# Patient Record
Sex: MALE | Race: WHITE
[De-identification: names, ages, dates, MRNs, and addresses within clinical notes are randomized per-mention and may not be internally consistent; named-entity substitution may affect disease eponyms.]

---

## 2021-01-13 ENCOUNTER — HOSPITAL ENCOUNTER (OUTPATIENT)
Dept: HOSPITAL 50 - VM.ED | Age: 81
Setting detail: OBSERVATION
LOS: 2 days | Discharge: NURSING, REHAB OR LONG-TERM CARE | End: 2021-01-15
Attending: ADVANCED PRACTICE MIDWIFE | Admitting: ADVANCED PRACTICE MIDWIFE
Payer: MEDICARE

## 2021-01-13 DIAGNOSIS — E87.8: ICD-10-CM

## 2021-01-13 DIAGNOSIS — Z88.8: ICD-10-CM

## 2021-01-13 DIAGNOSIS — N40.0: ICD-10-CM

## 2021-01-13 DIAGNOSIS — Z20.822: ICD-10-CM

## 2021-01-13 DIAGNOSIS — Z88.5: ICD-10-CM

## 2021-01-13 DIAGNOSIS — E86.0: Primary | ICD-10-CM

## 2021-01-13 DIAGNOSIS — Z79.899: ICD-10-CM

## 2021-01-13 DIAGNOSIS — Z79.82: ICD-10-CM

## 2021-01-13 DIAGNOSIS — R50.9: ICD-10-CM

## 2021-01-13 DIAGNOSIS — Z86.59: ICD-10-CM

## 2021-01-13 LAB
ANION GAP SERPL CALC-SCNC: 12.6 MMOL/L (ref 5–15)
CHLORIDE SERPL-SCNC: 117 MMOL/L (ref 98–107)
SODIUM SERPL-SCNC: 153 MMOL/L (ref 136–145)

## 2021-01-13 PROCEDURE — U0002 COVID-19 LAB TEST NON-CDC: HCPCS

## 2021-01-13 PROCEDURE — G0378 HOSPITAL OBSERVATION PER HR: HCPCS

## 2021-01-13 NOTE — EDM.PDOC
ED HPI GENERAL MEDICAL PROBLEM





- General


Chief Complaint: Fever


Stated Complaint: TEMP, NOT EATING OR DRINKING


Time Seen by Provider: 21 15:55


Source of Information: Reports: Family, Nursing Home Records





- History of Present Illness


INITIAL COMMENTS - FREE TEXT/NARRATIVE: 





Ilan is a 79 y/o man who is brought to the ER by EMS from the [facility]. He has had an intermittent fever up to 101.9 that came down with APAP 

suppositories. He also has been eating less and more lethargic. He is not verbal

at his baseline, but he has been more weak. COVID test at the nursing home this 

AM was negative.


Treatments PTA: Reports: IV/IO





- Related Data


                                    Allergies











Allergy/AdvReac Type Severity Reaction Status Date / Time


 


ciprofloxacin Allergy  Cannot Verified 21 15:55





   Remember  


 


codeine Allergy  Cannot Verified 21 15:55





   Remember  


 


curcumin Allergy  Cannot Uncoded 21 15:55





   Remember  














Past Medical History


Gastrointestinal History: Reports: Chronic Constipation, Hemorrhoids


Genitourinary History: Reports: BPH


Musculoskeletal History: Reports: Gout


Neurological History: Reports: Parkinson's


Psychiatric History: Reports: Dementia, Depression


Other Psychiatric History: lewy body dementia


Other Dermatologic History: dermatitis





Social & Family History





- Tobacco Use


Tobacco Use Status *Q: Unknown Ever Used Tobacco





Review of Systems





- Review of Systems


Review Of Systems: Unable To Obtain


Reason Not Obtained: Patient has dementia and any ROS obtained is noted in the 

ROS


Constitutional: Reports: Fever, Weakness





ED EXAM, GENERAL





- Physical Exam


Exam: See Below


Exam Limited By: Other (Hx of Lewy Body Demenita)


General Appearance: Alert, Cachetic (Elderly male, NAD. He is lying quietly on 

the ER cart. He does not offer any verbal response, but does open his eyes to 

his wife's voice.)


Eye Exam: Bilateral Eye: PERRL


Ears: Normal External Exam, Normal Canal, Normal TMs


Nose: Normal Inspection


Throat/Mouth: Other (Lips and tongue and dry and sticky)


Head: Atraumatic, Normocephalic


Neck: Normal Inspection, Supple, Non-Tender


Respiratory/Chest: No Respiratory Distress, Lungs Clear, Decreased Breath Sounds


Cardiovascular: Normal Peripheral Pulses, Regular Rate, Rhythm, No Murmur


GI/Abdominal: Normal Bowel Sounds, Soft, Other (note well healed midline 

surgical scar)


 (Male) Exam: Normal Inspection


Rectal (Males) Exam: Deferred


Back Exam: Normal Inspection


Extremities: Non-Tender, No Pedal Edema


Neurological: Alert, CN II-XII Intact, Slow to Respond, Unresponsive


Skin Exam: Warm, Dry, Intact, Normal Color


Lymphatic: No Adenopathy





Course





- Vital Signs


Text/Narrative:: 





1550 The patient was seen by the CNP. Labs and CXR ordered. He was given a liter

of NS by EMS enroute to the ER. Will await labs for further orders.





1715 CMP: Sodium=152, Chloride=117, BUN=52, Magnesium=52, CBC neg. Dr Soha Chacon consulted after labs reviewed. Influenza and COVID ordered, UA pending 

yet. NS 1 liter bolus ordered (#2)





1800 Remaining labs reviewed. COVID neg, Influenza A/B negative. UA neg. Case 

discussed with Dr Chacon and she does not feel he meets Acute Inpatient 

criteria. Patient was reassessed and he is not sitting up more on the ER cart, 

his wife has been offering him water and he drank 20 oz of water easily and IV 

fluids infusing. He now has his eyes open and is talking more to his wife and 

daughter.





CNP discussed either returning to [facility] for IV fluids and then 

having labs repeated and the patient seen by Dr Uyen Martinez in the AM for a 

recheck at the clinic or staying Observation for IV fluids and repeat labs. 

Patient's wife reports that he just recently moved here to the [facility] from [place] and he has not really gotten settled there yet so a night 

here on Observation would be her preference. Today is apparently his last day of

quarantine following the nursing home admission and his wife has not seen him 

until tonight since he was admitted.





Plan Observation Admission for IV fluids and repeat labs.








Last Recorded V/S: 


                                Last Vital Signs











Temp  37.1 C   21 15:30


 


Pulse  60   21 15:30


 


Resp  20   21 15:30


 


BP  123/75   21 15:30


 


Pulse Ox  95   21 15:30














- Orders/Labs/Meds


Orders: 


                               Active Orders 24 hr











 Category Date Time Status


 


 Chest 1V Frontal [CR] Stat Exams  21 16:29 Taken


 


 CULTURE BLOOD [BC] Stat Lab  21 16:40 Received


 


 CULTURE BLOOD [BC] Stat Lab  21 16:44 Received


 


 Sodium Chloride 0.9% [Normal Saline] 1,000 ml Med  21 16:30 Active





 IV ONETIME   


 


 Sodium Chloride 0.9% [Saline Flush] Med  21 16:29 Active





 10 ml FLUSH ASDIRECTED PRN   


 


 Blood Culture x2 Reflex Set [OM.PC] Stat Oth  21 16:29 Ordered


 


 Saline Lock Insert [OM.PC] Stat Oth  21 16:29 Ordered








                                Medication Orders





Sodium Chloride (Normal Saline)  1,000 mls @ 250 mls/hr IV ONETIME ONE


   Stop: 21 20:29


   Last Admin: 21 16:53  Dose: 250 mls/hr


   Documented by: KATHRYN


Sodium Chloride (Saline Flush)  10 ml FLUSH ASDIRECTED PRN


   PRN Reason: Keep Vein Open








Labs: 


                                Laboratory Tests











  21 Range/Units





  16:40 16:40 16:40 


 


WBC  6.5    (4.0-10.0)  x10^3/uL


 


RBC  4.02 L    (4.5-6.0)  x10^6/uL


 


Hgb  12.5 L    (14.0-18.0)  g/dL


 


Hct  39.0 L    (40.0-52.0)  %


 


MCV  97.0 H    (78.0-93.0)  fL


 


MCH  31.1    (26.0-32.0)  pg


 


MCHC  32.1    (32.0-36.0)  g/dL


 


RDW Coeff of Kevin  14.1    (10.0-15.0)  %


 


Plt Count  186    (130-400)  x10^3/uL


 


Neut % (Auto)  78.1    (50.0-80.0)  %


 


Lymph % (Auto)  16.1 L    (25.0-50.0)  %


 


Mono % (Auto)  5.5    (2.0-11.0)  %


 


Eos % (Auto)  0.0    (0.0-4.0)  %


 


Baso % (Auto)  0.3    (0.2-1.2)  %


 


Sodium   153 H   (136-145)  mmol/L


 


Potassium   3.6   (3.5-5.1)  mmol/L


 


Chloride   117 H   ()  mmol/L


 


Carbon Dioxide   27   (21-32)  mmol/L


 


Anion Gap   12.6   (5-15)  mmol/L


 


BUN   52 H   (7-18)  mg/dL


 


Creatinine   1.1   (0.70-1.30)  mg/dL


 


Est Cr Clr Drug Dosing   TNP   


 


Estimated GFR (MDRD)   > 60   


 


Glucose   106   ()  mg/dL


 


Lactic Acid    0.9  (0.4-2.0)  mmol/L


 


Calcium   8.6   (8.5-10.1)  mg/dL


 


Corrected Calcium   9.24   (8.5-10.1)  mg/dL


 


Magnesium   2.5 H   (1.8-2.4)  mg/dL


 


Total Bilirubin   0.8   (0.2-1.0)  mg/dL


 


AST   24   (15-37)  U/L


 


ALT   11 L   (16-63)  U/L


 


Alkaline Phosphatase   85   ()  U/L


 


C-Reactive Protein   1.1 H   (<=0.9)  mg/dL


 


Total Protein   6.4   (6.4-8.2)  g/dL


 


Albumin   3.2 L   (3.4-5.0)  g/dL


 


Globulin   3.2   


 


Albumin/Globulin Ratio   1.00   


 


Urine Color     (YELLOW)  


 


Urine Appearance     (CLEAR)  


 


Urine pH     (5.0-8.0)  


 


Ur Specific Gravity     


 


Urine Protein     (NEGATIVE)  mg/dL


 


Urine Glucose (UA)     (NEGATIVE)  mg/dL


 


Urine Ketones     (NEGATIVE)  mg/dL


 


Urine Occult Blood     (NEGATIVE)  


 


Urine Nitrite     (NEGATIVE)  


 


Urine Bilirubin     (NEGATIVE)  


 


Urine Urobilinogen     (0.2)  EU/dL


 


Ur Leukocyte Esterase     (NEGATIVE)  


 


U Hyaline Cast (Auto)     


 


Urine RBC     (NOT SEEN)  /HPF


 


Urine WBC     (NOT SEEN)  /HPF


 


Ur Squamous Epith Cells     (NEGATIVE)  /HPF


 


Urine Bacteria     (NEGATIVE)  /HPF


 


Urine Mucus     (NEGATIVE)  /LPF


 


SARS CoV-2 RNA Rapid LIZETH     (NEGATIVE)  














  21 Range/Units





  17:25 17:33 


 


WBC    (4.0-10.0)  x10^3/uL


 


RBC    (4.5-6.0)  x10^6/uL


 


Hgb    (14.0-18.0)  g/dL


 


Hct    (40.0-52.0)  %


 


MCV    (78.0-93.0)  fL


 


MCH    (26.0-32.0)  pg


 


MCHC    (32.0-36.0)  g/dL


 


RDW Coeff of Kevin    (10.0-15.0)  %


 


Plt Count    (130-400)  x10^3/uL


 


Neut % (Auto)    (50.0-80.0)  %


 


Lymph % (Auto)    (25.0-50.0)  %


 


Mono % (Auto)    (2.0-11.0)  %


 


Eos % (Auto)    (0.0-4.0)  %


 


Baso % (Auto)    (0.2-1.2)  %


 


Sodium    (136-145)  mmol/L


 


Potassium    (3.5-5.1)  mmol/L


 


Chloride    ()  mmol/L


 


Carbon Dioxide    (21-32)  mmol/L


 


Anion Gap    (5-15)  mmol/L


 


BUN    (7-18)  mg/dL


 


Creatinine    (0.70-1.30)  mg/dL


 


Est Cr Clr Drug Dosing    


 


Estimated GFR (MDRD)    


 


Glucose    ()  mg/dL


 


Lactic Acid    (0.4-2.0)  mmol/L


 


Calcium    (8.5-10.1)  mg/dL


 


Corrected Calcium    (8.5-10.1)  mg/dL


 


Magnesium    (1.8-2.4)  mg/dL


 


Total Bilirubin    (0.2-1.0)  mg/dL


 


AST    (15-37)  U/L


 


ALT    (16-63)  U/L


 


Alkaline Phosphatase    ()  U/L


 


C-Reactive Protein    (<=0.9)  mg/dL


 


Total Protein    (6.4-8.2)  g/dL


 


Albumin    (3.4-5.0)  g/dL


 


Globulin    


 


Albumin/Globulin Ratio    


 


Urine Color   Yellow  (YELLOW)  


 


Urine Appearance   Slightly cloudy H  (CLEAR)  


 


Urine pH   6.5  (5.0-8.0)  


 


Ur Specific Gravity   1.020  


 


Urine Protein   Trace H  (NEGATIVE)  mg/dL


 


Urine Glucose (UA)   Negative  (NEGATIVE)  mg/dL


 


Urine Ketones   Negative  (NEGATIVE)  mg/dL


 


Urine Occult Blood   Negative  (NEGATIVE)  


 


Urine Nitrite   Negative  (NEGATIVE)  


 


Urine Bilirubin   Negative  (NEGATIVE)  


 


Urine Urobilinogen   0.2  (0.2)  EU/dL


 


Ur Leukocyte Esterase   Negative  (NEGATIVE)  


 


U Hyaline Cast (Auto)   Few  


 


Urine RBC   0-5  (NOT SEEN)  /HPF


 


Urine WBC   0-5  (NOT SEEN)  /HPF


 


Ur Squamous Epith Cells   Not seen  (NEGATIVE)  /HPF


 


Urine Bacteria   Rare  (NEGATIVE)  /HPF


 


Urine Mucus   Few H  (NEGATIVE)  /LPF


 


SARS CoV-2 RNA Rapid LIZETH  Negative   (NEGATIVE)  











Meds: 


Medications











Generic Name Dose Route Start Last Admin





  Trade Name Freq  PRN Reason Stop Dose Admin


 


Sodium Chloride  1,000 mls @ 250 mls/hr  21 16:30  21 16:53





  Normal Saline  IV  21 20:29  250 mls/hr





  ONETIME ONE   Administration


 


Sodium Chloride  10 ml  21 16:29 





  Saline Flush  FLUSH  





  ASDIRECTED PRN  





  Keep Vein Open  














Discontinued Medications














Generic Name Dose Route Start Last Admin





  Trade Name Freq  PRN Reason Stop Dose Admin


 


Sodium Chloride  1,000 mls @ 999 mls/hr  21 17:12 





  Normal Saline  IV  21 18:12 





  ONETIME ONE  














Departure





- Departure


Time of Disposition: 18:24


Disposition:  20


Condition: Good


Clinical Impression: 


 Dehydration, Electrolyte imbalance, History of dementia, Nursing home resident








- Discharge Information


Referrals: 


PCP,Unknown [Primary Care Provider] - 


Forms:  ED Department Discharge





Sepsis Event Note (ED)





- Evaluation


Sepsis Screening Result: No Definite Risk





- Focused Exam


Vital Signs: 


                                   Vital Signs











  Temp Pulse Resp BP Pulse Ox


 


 21 15:30  37.1 C  60  20  123/75  95














- Problem List & Annotations


(1) Dehydration


SNOMED Code(s): 90314425


   Code(s): E86.0 - DEHYDRATION   Status: Acute   Current Visit: Yes   





(2) Electrolyte imbalance


SNOMED Code(s): 231093815


   Code(s): E87.8 - OTH DISORDERS OF ELECTROLYTE AND FLUID BALANCE, NEC   

Status: Acute   Current Visit: Yes   Annotation/Comment:: -Plan rehydration with

IV fluids overnight


-Repeat labs in the AM


-Push oral fluids   





(3) History of dementia


SNOMED Code(s): 309919644


   Code(s): Z86.59 - PERSONAL HISTORY OF OTHER MENTAL AND BEHAVIORAL DISORDERS  

Status: Acute   Current Visit: Yes   Annotation/Comment:: -Patient forgets to 

eat and drink as needed, will have staff encourage fluids.   





(4) Nursing home resident


SNOMED Code(s): 854943321


   Code(s): Z59.3 - PROBLEMS RELATED TO LIVING IN RESIDENTIAL INSTITUTION   

Status: Acute   Current Visit: Yes   Annotation/Comment:: -Recently moved to 

[facility] 14 days ago. Just finishing admission quarantine at the 

nursing home.    





- Problem List Review


Problem List Initiated/Reviewed/Updated: Yes





- My Orders


Last 24 Hours: 


My Active Orders





21 16:29


Chest 1V Frontal [CR] Stat 


Sodium Chloride 0.9% [Saline Flush]   10 ml FLUSH ASDIRECTED PRN 


Blood Culture x2 Reflex Set [OM.PC] Stat 


Saline Lock Insert [OM.PC] Stat 





21 16:30


Sodium Chloride 0.9% [Normal Saline] 1,000 ml IV ONETIME 





21 16:40


CULTURE BLOOD [BC] Stat 





21 16:44


CULTURE BLOOD [BC] Stat 














- Assessment/Plan


Admission H&P: Please use this note as an admission H&P


Last 24 Hours: 


My Active Orders





21 16:29


Chest 1V Frontal [CR] Stat 


Sodium Chloride 0.9% [Saline Flush]   10 ml FLUSH ASDIRECTED PRN 


Blood Culture x2 Reflex Set [OM.PC] Stat 


Saline Lock Insert [OM.PC] Stat 





21 16:30


Sodium Chloride 0.9% [Normal Saline] 1,000 ml IV ONETIME 





21 16:40


CULTURE BLOOD [BC] Stat 





21 16:44


CULTURE BLOOD [BC] Stat 











Assessment:: 





1)Dehydration


2)Electrolyte Imbalance


3)Hx of Dementia


4)Nursing Home Resident


Plan: 





-Admit to Observation.


-See Orders.

## 2021-01-14 LAB
ANION GAP SERPL CALC-SCNC: 10.4 MMOL/L (ref 5–15)
CHLORIDE SERPL-SCNC: 116 MMOL/L (ref 98–107)
SODIUM SERPL-SCNC: 150 MMOL/L (ref 136–145)

## 2021-01-14 RX ADMIN — Medication SCH: at 12:02

## 2021-01-14 RX ADMIN — Medication SCH: at 21:18

## 2021-01-14 RX ADMIN — Medication SCH: at 21:14

## 2021-01-14 NOTE — PCM.PN
- General Info


Date of Service: 01/14/21


Admission Dx/Problem (Free Text): 





Dehydration


electrolyte abnormality


Hx of Lewy Body Dementai


Nursing home resident. 


Subjective Update: 





Per the nursing staff the patient rested throughout the night.  He is nonverbal 

therefore unable to obtain review of systems.





- Patient Data


Vitals - Most Recent: 


                                Last Vital Signs











Temp  98.5 F   01/14/21 10:00


 


Pulse  55 L  01/14/21 10:00


 


Resp  16   01/14/21 10:00


 


BP  109/68   01/14/21 10:00


 


Pulse Ox  96   01/14/21 10:00











Weight - Most Recent: 132 lb


I&O - Last 24 Hours: 


                                 Intake & Output











 01/13/21 01/14/21 01/14/21





 22:59 06:59 14:59


 


Intake Total 2199 552 10


 


Output Total  1 


 


Balance 2199 551 10











Lab Results Last 24 Hours: 


                         Laboratory Results - last 24 hr











  01/13/21 01/13/21 01/13/21 Range/Units





  16:40 16:40 16:40 


 


WBC  6.5    (4.0-10.0)  x10^3/uL


 


RBC  4.02 L    (4.5-6.0)  x10^6/uL


 


Hgb  12.5 L    (14.0-18.0)  g/dL


 


Hct  39.0 L    (40.0-52.0)  %


 


MCV  97.0 H    (78.0-93.0)  fL


 


MCH  31.1    (26.0-32.0)  pg


 


MCHC  32.1    (32.0-36.0)  g/dL


 


RDW Coeff of Kevin  14.1    (10.0-15.0)  %


 


Plt Count  186    (130-400)  x10^3/uL


 


Neut % (Auto)  78.1    (50.0-80.0)  %


 


Lymph % (Auto)  16.1 L    (25.0-50.0)  %


 


Mono % (Auto)  5.5    (2.0-11.0)  %


 


Eos % (Auto)  0.0    (0.0-4.0)  %


 


Baso % (Auto)  0.3    (0.2-1.2)  %


 


Sodium   153 H   (136-145)  mmol/L


 


Potassium   3.6   (3.5-5.1)  mmol/L


 


Chloride   117 H   ()  mmol/L


 


Carbon Dioxide   27   (21-32)  mmol/L


 


Anion Gap   12.6   (5-15)  mmol/L


 


BUN   52 H   (7-18)  mg/dL


 


Creatinine   1.1   (0.70-1.30)  mg/dL


 


Est Cr Clr Drug Dosing   TNP   


 


Estimated GFR (MDRD)   > 60   


 


Glucose   106   ()  mg/dL


 


Lactic Acid    0.9  (0.4-2.0)  mmol/L


 


Calcium   8.6   (8.5-10.1)  mg/dL


 


Corrected Calcium   9.24   (8.5-10.1)  mg/dL


 


Magnesium   2.5 H   (1.8-2.4)  mg/dL


 


Total Bilirubin   0.8   (0.2-1.0)  mg/dL


 


AST   24   (15-37)  U/L


 


ALT   11 L   (16-63)  U/L


 


Alkaline Phosphatase   85   ()  U/L


 


C-Reactive Protein   1.1 H   (<=0.9)  mg/dL


 


Total Protein   6.4   (6.4-8.2)  g/dL


 


Albumin   3.2 L   (3.4-5.0)  g/dL


 


Globulin   3.2   


 


Albumin/Globulin Ratio   1.00   


 


Urine Color     (YELLOW)  


 


Urine Appearance     (CLEAR)  


 


Urine pH     (5.0-8.0)  


 


Ur Specific Gravity     


 


Urine Protein     (NEGATIVE)  mg/dL


 


Urine Glucose (UA)     (NEGATIVE)  mg/dL


 


Urine Ketones     (NEGATIVE)  mg/dL


 


Urine Occult Blood     (NEGATIVE)  


 


Urine Nitrite     (NEGATIVE)  


 


Urine Bilirubin     (NEGATIVE)  


 


Urine Urobilinogen     (0.2)  EU/dL


 


Ur Leukocyte Esterase     (NEGATIVE)  


 


U Hyaline Cast (Auto)     


 


Urine RBC     (NOT SEEN)  /HPF


 


Urine WBC     (NOT SEEN)  /HPF


 


Ur Squamous Epith Cells     (NEGATIVE)  /HPF


 


Urine Bacteria     (NEGATIVE)  /HPF


 


Urine Mucus     (NEGATIVE)  /LPF


 


SARS CoV-2 RNA Rapid LIZETH     (NEGATIVE)  














  01/13/21 01/13/21 01/14/21 Range/Units





  17:25 17:33 06:19 


 


WBC    6.4  (4.0-10.0)  x10^3/uL


 


RBC    3.92 L  (4.5-6.0)  x10^6/uL


 


Hgb    12.1 L  (14.0-18.0)  g/dL


 


Hct    37.7 L  (40.0-52.0)  %


 


MCV    96.2 H  (78.0-93.0)  fL


 


MCH    30.9  (26.0-32.0)  pg


 


MCHC    32.1  (32.0-36.0)  g/dL


 


RDW Coeff of Kevin    13.7  (10.0-15.0)  %


 


Plt Count    169  (130-400)  x10^3/uL


 


Neut % (Auto)    80.4 H  (50.0-80.0)  %


 


Lymph % (Auto)    13.8 L  (25.0-50.0)  %


 


Mono % (Auto)    5.4  (2.0-11.0)  %


 


Eos % (Auto)    0.2  (0.0-4.0)  %


 


Baso % (Auto)    0.2  (0.2-1.2)  %


 


Sodium     (136-145)  mmol/L


 


Potassium     (3.5-5.1)  mmol/L


 


Chloride     ()  mmol/L


 


Carbon Dioxide     (21-32)  mmol/L


 


Anion Gap     (5-15)  mmol/L


 


BUN     (7-18)  mg/dL


 


Creatinine     (0.70-1.30)  mg/dL


 


Est Cr Clr Drug Dosing     


 


Estimated GFR (MDRD)     


 


Glucose     ()  mg/dL


 


Lactic Acid     (0.4-2.0)  mmol/L


 


Calcium     (8.5-10.1)  mg/dL


 


Corrected Calcium     (8.5-10.1)  mg/dL


 


Magnesium     (1.8-2.4)  mg/dL


 


Total Bilirubin     (0.2-1.0)  mg/dL


 


AST     (15-37)  U/L


 


ALT     (16-63)  U/L


 


Alkaline Phosphatase     ()  U/L


 


C-Reactive Protein     (<=0.9)  mg/dL


 


Total Protein     (6.4-8.2)  g/dL


 


Albumin     (3.4-5.0)  g/dL


 


Globulin     


 


Albumin/Globulin Ratio     


 


Urine Color   Yellow   (YELLOW)  


 


Urine Appearance   Slightly cloudy H   (CLEAR)  


 


Urine pH   6.5   (5.0-8.0)  


 


Ur Specific Gravity   1.020   


 


Urine Protein   Trace H   (NEGATIVE)  mg/dL


 


Urine Glucose (UA)   Negative   (NEGATIVE)  mg/dL


 


Urine Ketones   Negative   (NEGATIVE)  mg/dL


 


Urine Occult Blood   Negative   (NEGATIVE)  


 


Urine Nitrite   Negative   (NEGATIVE)  


 


Urine Bilirubin   Negative   (NEGATIVE)  


 


Urine Urobilinogen   0.2   (0.2)  EU/dL


 


Ur Leukocyte Esterase   Negative   (NEGATIVE)  


 


U Hyaline Cast (Auto)   Few   


 


Urine RBC   0-5   (NOT SEEN)  /HPF


 


Urine WBC   0-5   (NOT SEEN)  /HPF


 


Ur Squamous Epith Cells   Not seen   (NEGATIVE)  /HPF


 


Urine Bacteria   Rare   (NEGATIVE)  /HPF


 


Urine Mucus   Few H   (NEGATIVE)  /LPF


 


SARS CoV-2 RNA Rapid LIZETH  Negative    (NEGATIVE)  














  01/14/21 01/14/21 Range/Units





  06:19 06:19 


 


WBC    (4.0-10.0)  x10^3/uL


 


RBC    (4.5-6.0)  x10^6/uL


 


Hgb    (14.0-18.0)  g/dL


 


Hct    (40.0-52.0)  %


 


MCV    (78.0-93.0)  fL


 


MCH    (26.0-32.0)  pg


 


MCHC    (32.0-36.0)  g/dL


 


RDW Coeff of Kevin    (10.0-15.0)  %


 


Plt Count    (130-400)  x10^3/uL


 


Neut % (Auto)    (50.0-80.0)  %


 


Lymph % (Auto)    (25.0-50.0)  %


 


Mono % (Auto)    (2.0-11.0)  %


 


Eos % (Auto)    (0.0-4.0)  %


 


Baso % (Auto)    (0.2-1.2)  %


 


Sodium  150 H   (136-145)  mmol/L


 


Potassium  3.4 L   (3.5-5.1)  mmol/L


 


Chloride  116 H   ()  mmol/L


 


Carbon Dioxide  27   (21-32)  mmol/L


 


Anion Gap  10.4   (5-15)  mmol/L


 


BUN  43 H   (7-18)  mg/dL


 


Creatinine  0.8   (0.70-1.30)  mg/dL


 


Est Cr Clr Drug Dosing  TNP   


 


Estimated GFR (MDRD)  > 60   


 


Glucose  96   ()  mg/dL


 


Lactic Acid    (0.4-2.0)  mmol/L


 


Calcium  8.4 L   (8.5-10.1)  mg/dL


 


Corrected Calcium  9.28   (8.5-10.1)  mg/dL


 


Magnesium  2.3   (1.8-2.4)  mg/dL


 


Total Bilirubin  0.8   (0.2-1.0)  mg/dL


 


AST  20   (15-37)  U/L


 


ALT  25   (16-63)  U/L


 


Alkaline Phosphatase  81   ()  U/L


 


C-Reactive Protein   0.9  (<=0.9)  mg/dL


 


Total Protein  6.0 L   (6.4-8.2)  g/dL


 


Albumin  2.9 L   (3.4-5.0)  g/dL


 


Globulin  3.1   


 


Albumin/Globulin Ratio  0.94   


 


Urine Color    (YELLOW)  


 


Urine Appearance    (CLEAR)  


 


Urine pH    (5.0-8.0)  


 


Ur Specific Gravity    


 


Urine Protein    (NEGATIVE)  mg/dL


 


Urine Glucose (UA)    (NEGATIVE)  mg/dL


 


Urine Ketones    (NEGATIVE)  mg/dL


 


Urine Occult Blood    (NEGATIVE)  


 


Urine Nitrite    (NEGATIVE)  


 


Urine Bilirubin    (NEGATIVE)  


 


Urine Urobilinogen    (0.2)  EU/dL


 


Ur Leukocyte Esterase    (NEGATIVE)  


 


U Hyaline Cast (Auto)    


 


Urine RBC    (NOT SEEN)  /HPF


 


Urine WBC    (NOT SEEN)  /HPF


 


Ur Squamous Epith Cells    (NEGATIVE)  /HPF


 


Urine Bacteria    (NEGATIVE)  /HPF


 


Urine Mucus    (NEGATIVE)  /LPF


 


SARS CoV-2 RNA Rapid LIZETH    (NEGATIVE)  











Anjel Results Last 24 Hours: 


                                  Microbiology











 01/13/21 17:25 Influenza Type A Antigen Screen - Final





 Nasopharyngeal Swab    NEGATIVE INFLUENZA A VIRUS AG





    REFERENCE RANGE: NEGATIVE





 Influenza Type B Antigen Screen - Final





    NEGATIVE INFLUENZA B VIRUS AG





    REFERENCE RANGE: NEGATIVE











Med Orders - Current: 


                               Current Medications





Acetaminophen (Tylenol)  650 mg RECTAL Q4H PRN


   PRN Reason: Mild pain/fever


Acetaminophen (Tylenol)  650 mg PO Q4H PRN


   PRN Reason: Pain/Fever


Acetaminophen (Tylenol)  650 mg RECTAL Q4H PRN


   PRN Reason: Pain/Fever


Allopurinol (Zyloprim)  300 mg PO DAILY [facility]


   Last Admin: 01/14/21 08:51 Dose:  300 mg


   Documented by: 


Aspirin (Ecotrin)  325 mg PO DAILY PRN


   PRN Reason: Chest Pain


Bisacodyl (Dulcolax)  10 mg RECTAL DAILY PRN


   PRN Reason: Constipation


Bisacodyl (Dulcolax)  5 mg PO DAILY PRN


   PRN Reason: Constipation


Carbidopa/Levodopa (Sinemet  Mg)  1.5 tab PO TID [facility]


   Last Admin: 01/14/21 12:02 Dose:  1.5 tab


   Documented by: 


Diclofenac Sodium (Voltaren 1% Gel)  0 gm TOP Q6H PRN


   PRN Reason: Pain


Donepezil HCl (Aricept)  10 mg PO DAILY [facility]


   Last Admin: 01/14/21 08:50 Dose:  10 mg


   Documented by: 


Finasteride (Proscar)  5 mg PO DAILY [facility]


   Last Admin: 01/14/21 12:02 Dose:  5 mg


   Documented by: 


Hydrocortisone (Hydrocortisone 1% Crm)  0 gm TOP BID [facility]


Sodium Chloride (Normal Saline)  1,000 mls @ 100 mls/hr IV ASDIRECTED [facility]


   Last Admin: 01/13/21 19:58 Dose:  100 mls/hr


   Documented by: 


Lactated Ringer's (Ringers, Lactated)  1,000 mls @ 75 mls/hr IV ASDIRECTED [facility]


   Last Admin: 01/14/21 12:03 Dose:  75 mls/hr


   Documented by: 


Ibuprofen (Motrin)  400 mg PO Q6H PRN


   PRN Reason: Pain


Melatonin (Melatonin)  3 mg PO BEDTIME [facility]


Eucalyptus Oil/Menthol/Camphor [ Vicks Vaporub Ointment]  1 applic TOP BID [facility]


   Last Admin: 01/14/21 12:02 Dose:  Not Given


   Documented by: 


Ketoconazole [ (Nizoral 2% Crm])  1 applic TOP BID PRN


   PRN Reason: Other


Ketoconazole [ (Nizoral 2% Crm])  1 applic TOP BID [facility]


   Last Admin: 01/14/21 12:02 Dose:  Not Given


   Documented by: 


Senna/Docusate Sodium (Senna Plus)  1 tab PO DAILY [facility]


   Last Admin: 01/14/21 12:02 Dose:  1 tab


   Documented by: 


Sertraline HCl (Zoloft)  50 mg PO DAILY [facility]


   Last Admin: 01/14/21 08:49 Dose:  50 mg


   Documented by: 


Sodium Chloride (Saline Flush)  10 ml FLUSH ASDIRECTED PRN


   PRN Reason: Keep Vein Open





Discontinued Medications





Sodium Chloride (Normal Saline)  1,000 mls @ 250 mls/hr IV ONETIME ONE


   Stop: 01/13/21 20:29


   Last Admin: 01/13/21 16:53 Dose:  250 mls/hr


   Documented by: 


Sodium Chloride (Normal Saline)  1,000 mls @ 999 mls/hr IV ONETIME ONE


   Stop: 01/13/21 18:12


   Last Admin: 01/13/21 18:00 Dose:  999 mls/hr


   Documented by: 








Comments:: 





T-max throughout the night was a temperature 98.5.





He had a rather large soaked void during the night.  He has not voided yet this 

morning.  He continues to be nonverbal she is his baseline.  He is not eating 

any solids.  He has drank his milk and some water this morning.  His wife is at 

the bedside.  The wife relates that other than not eating he is back to his 

baseline that he was at the nursing home. he has not been out of bed.





- Exam


General: Alert, No Acute Distress, Other (He is alert looking about the room and

somewhat tracks.  He answers questions yes very sporadically and minimally.)


HEENT: Pupils Equal, Pupils Reactive, EOMI.  No: Mucous Membr. Moist/Pink (Oral 

mucosas mildly dry.)


Neck: Supple


Lungs: Clear to Auscultation, Normal Respiratory Effort


Cardiovascular: Regular Rate, Regular Rhythm


GI/Abdominal Exam: Normal Bowel Sounds, Soft, Non-Tender, No Organomegaly, No 

Distention, Pelvis Stable


 (Male) Exam: Deferred


Back Exam: Normal Inspection


Extremities: Normal Inspection, Normal Range of Motion, Non-Tender, No Pedal 

Edema, Normal Capillary Refill


Peripheral Pulses: 2+: Radial (L), Radial (R), Posterior Tibial (L), Posterior 

Tibial (R), Dorsalis Pedis (L), Dorsalis Pedis (R)


Skin: Warm, Dry, Intact


Neurological: No New Focal Deficit, Other (He is alert looking about the room.  

He does not really move spontaneously.  He does answer minimal questions with 

yes very sporadically and minimally.)


Psy/Mental Status: Alert, Other (Unable to further assess due to his dementia.)





Sepsis Event Note





- Evaluation


Sepsis Screening Result: No Definite Risk





- Focused Exam


Vital Signs: 


                                   Vital Signs











  Temp Pulse Resp BP Pulse Ox


 


 01/14/21 10:00  98.5 F  55 L  16  109/68  96


 


 01/14/21 06:07  98 F  53 L  16  114/70  96


 


 01/14/21 02:00  98.4 F  52 L  15  120/77  98














- Problem List & Annotations


(1) Dehydration


SNOMED Code(s): 77483145


   Code(s): E86.0 - DEHYDRATION   Status: Acute   Current Visit: Yes   





(2) Electrolyte imbalance


SNOMED Code(s): 608824027


   Code(s): E87.8 - OTH DISORDERS OF ELECTROLYTE AND FLUID BALANCE, NEC   

Status: Acute   Current Visit: Yes   Annotation/Comment:: -Plan rehydration with

IV fluids overnight


-Repeat labs in the AM


-Push oral fluids   





(3) History of dementia


SNOMED Code(s): 729836367


   Code(s): Z86.59 - PERSONAL HISTORY OF OTHER MENTAL AND BEHAVIORAL DISORDERS  

Status: Acute   Current Visit: Yes   Annotation/Comment:: -Patient forgets to 

eat and drink as needed, will have staff encourage fluids.   





(4) Nursing home resident


SNOMED Code(s): 764723950


   Code(s): Z59.3 - PROBLEMS RELATED TO LIVING IN RESIDENTIAL INSTITUTION   

Status: Acute   Current Visit: Yes   Annotation/Comment:: -Recently moved to 

[facility] 14 days ago. Just finishing admission quarantine at the 

nursing home.    





- Problem List Review


Problem List Initiated/Reviewed/Updated: Yes





- My Orders


Last 24 Hours: 


My Active Orders





01/15/21 05:11


Chest 1V Frontal [CR] AM 


BASIC METABOLIC PANEL,BMP [CHEM] AM 


C-REACTIVE PROTEIN [CHEM] AM 


CBC WITH AUTO DIFF [HEME] AM 


LACTIC ACID [CHEM] AM 


MAGNESIUM [CHEM] AM 














- Assessment


Assessment:: 





A/P





The patient's electrolyte abnormalities have minimally improved.  We have 

switched him from normal saline to lactated Ringer's with his presence of 

hypernatremia as well as hyperchloremia.  Function is appropriate.  He has taken

 some fluids in today but has not taken much for solids.  We will continue 

observation at this time as he has had little urinary output and see if we can 

improve his electrolyte status further.





Discussed with the patient's wife that this could be a reoccurring problem with 

his worsening possibly dementia where he is not eating or drinking he will have 

continued problems with electrolyte abnormalities.  At this time we will 

continue observation most likely discharge home back to the nursing home 

tomorrow.  She is comfortable with this plan.





#1: Dehydration.  We will continue his lactated Ringer's at 75 mils an hour.  

Repeat labs in the morning.  We will push oral fluids as well as oral solids.





2.  Electrolyte abnormality.  His sodium is still minimally elevated as well as 

his chloride.  Sodium 150, chloride 116, magnesium improved to 2.3.  His 

potassium is 3.4.  We will not supplement at this time.





#3 history of Lewy body dementia.  Continue home medications.





#4.  Nursing home resident.





VTE.  The patient is nonambulatory at this time.  I will start him on Lovenox 40

 mg subcu.


Sepsis T-max 98.5 in the last 24 hours or so.  Normal white blood cell count and

 CRP.  Blood cultures pending.  Repeat chest x-ray in the morning following for 

atelectasis.





CODE STATUS CPR only.





We will continue observation as the patient still is not taking in good oral 

hydration as well as solids.  He still has some electrolyte abnormality.  

Discussion was also held with the patient's wife that this could be chronic 

sequelae of his worsening Lewy body dementia. I also discussed the case with the

 patients PCP Dr. Martinez as well.

## 2021-01-15 LAB
ANION GAP SERPL CALC-SCNC: 10.4 MMOL/L (ref 5–15)
CHLORIDE SERPL-SCNC: 108 MMOL/L (ref 98–107)
SODIUM SERPL-SCNC: 142 MMOL/L (ref 136–145)

## 2021-01-15 RX ADMIN — Medication SCH: at 08:55

## 2021-01-15 NOTE — [id number]
**Discharge Summary





- Hospital Course


HPI Initial Comments: 





Ilan is a 79 y/o man who is brought to the ER by EMS from the [facility]. He has had an intermittent fever up to 101.9 that came down with APAP 

suppositories. He also has been eating less and more lethargic. He is not verbal

at his baseline, but he has been more weak. COVID test at the nursing home this 

AM was negative.


Diagnosis: Stroke: No





- Discharge Data


Discharge Date: 01/15/21


Discharge Disposition: DC/Tfer to SNF 03


Condition: Good





- Referral to Home Health


Primary Care Physician: 


PCP Unknown








- Discharge Diagnosis/Problem(s)


(1) Dehydration


SNOMED Code(s): 00667915


   ICD Code: E86.0 - DEHYDRATION   Status: Acute   





(2) Electrolyte imbalance


SNOMED Code(s): 886122073


   ICD Code: E87.8 - OTH DISORDERS OF ELECTROLYTE AND FLUID BALANCE, NEC   

Status: Acute   Problem Details: -Plan rehydration with IV fluids overnight


-Repeat labs in the AM


-Push oral fluids   





(3) History of dementia


SNOMED Code(s): 170879410


   ICD Code: Z86.59 - PERSONAL HISTORY OF OTHER MENTAL AND BEHAVIORAL DISORDERS 

 Status: Acute   Problem Details: -Patient forgets to eat and drink as needed, 

will have staff encourage fluids.   





(4) Nursing home resident


SNOMED Code(s): 853592078


   ICD Code: Z59.3 - PROBLEMS RELATED TO LIVING IN RESIDENTIAL INSTITUTION   

Status: Acute   Problem Details: -Recently moved to [facility] 14 days

ago. Just finishing admission quarantine at the nursing home.    





- Patient Summary/Data


Hospital Course: 





Patient was admitted into the hospital under observation for decreased level of 

consciousness as well as electrolyte abnormalities and dehydration.  This 

patient recently has moved to the nursing home with a history of Lewy body 

dementia and has had quite a bit of neurological decline.  He was ambulatory 

initially when he went to the nursing home 2 weeks ago but now he really needs 

the assist of 2 he is not talking nor is he been eating or drinking much at the 

nursing home.  He was noticed to be more obtunded at the nursing home with a 

fever.  He was brought to the emergency department evaluated no site of fever 

was identified although he was severely dehydrated as well as some electrolyte 

abnormalities.  He was hydrated with lactated Ringer's over the next 48 hours in

the hospital.  His level consciousness is back to baseline according to the 

wife.  His sodium improved back to normal.  He has had good urinary output as 

well as bowel movements.  This most likely is a sequelae of worsening Lewy body 

dementia as he has not been eating or drinking at the nursing home and he will 

have recurrence of this dehydration electrolyte abnormality and he has had quite

a bit of weight loss approximately 40 pounds in the last couple of months 

according to the wife.  He is not on hospice at this time.  His wife had a 

rather long discussion with myself about what the next steps would be at the 

nursing home.  I have concern that this will be a reoccurrence and continued 

problem due to his worsening dementia.  I will supplement his feeding with 

Ensure at the nursing home although it takes quite a bit of time and 

intervention for his wife to be able to get him to eat and drink she does not 

feel that the nursing home will have that ability to do so for him.  At this 

time he is well-hydrated his laboratory evaluation is back to normal we will 

discharge him back to the nursing home he is wife is going to discuss with the 

primary care provider as well as the nursing home about considering changing him

to hospice that she can get him back to his home [facility] where she can

be at his bedside to feed him more aggressively as she has a time to do so.  

Otherwise his therapies will continue the same with the addition of the Ensure 

high-protein.





- Patient Instructions


Diet: Drink 8-10+ Glasses/Day, Weight Loss Diet


Other/Special Instructions: Continue with all previous therapies at the nursing 

home.  Must encourage regular drinks of fluids and spend more time with feeding 

at the nursing home to ensure appropriate caloric intake.  Add Ensure high 

protein at all meals as well as evening snack and staff needs to take the time 

to get him to drink it.  Recheck with PCP in one week via telemedicine.





- Discharge Plan


*PRESCRIPTION DRUG MONITORING PROGRAM REVIEWED*: Not Applicable


*COPY OF PRESCRIPTION DRUG MONITORING REPORT IN PATIENT BROCK: Not Applicable


Home Medications: 


                                    Home Meds





Acetaminophen 650 mg PO Q4H PRN 01/13/21 [History]


Acetaminophen 650 mg RECTAL Q4H PRN 01/13/21 [History]


Allopurinol [Zyloprim] 300 mg PO DAILY 01/13/21 [History]


Aspirin 325 mg PO DAILY PRN 01/13/21 [History]


Carbidopa/Levodopa [Sinemet  mg Tablet] 1.5 tab PO TID 01/13/21 [History]


Diclofenac Sodium [Voltaren Arthritis Pain] 2 gram TOP Q6H PRN 01/13/21 

[History]


Donepezil HCl 10 mg PO DAILY 01/13/21 [History]


Eucalyptus Oil/Menthol/Camphor [Vicks Vaporub Ointment] 1 applic TOP BID 

01/13/21 [History]


Finasteride 5 mg PO DAILY 01/13/21 [History]


Hydrocortisone [Hydrocortisone 1% Crm] 1 applic TOP BID 01/13/21 [History]


Ibuprofen 400 mg PO Q6H PRN 01/13/21 [History]


Ketoconazole [Nizoral 2% Crm] 1 applic TOP BID 01/13/21 [History]


Ketoconazole [Nizoral 2% Crm] 1 applic TOP BID PRN 01/13/21 [History]


Melatonin 3 mg PO BEDTIME 01/13/21 [History]


Sennosides/Docusate Sodium [Senna-Docusate Sodium Tablet] 1 tab PO DAILY 

01/13/21 [History]


Sertraline [Zoloft] 50 mg PO DAILY 01/13/21 [History]


bisacodyL [Bisacodyl] 10 mg RECTAL DAILY PRN 01/13/21 [History]


bisacodyL [Dulcolax] 5 mg PO DAILY PRN 01/13/21 [History]








Forms:  ED Department Discharge


Referrals: 


PCP,Unknown [Ordering Only Provider] - 





- Discharge Summary/Plan Comment


DC Time >30 min.: Yes





- General Info


Date of Service: 01/15/21


Admission Dx/Problem (Free Text: 





Dehydration


electrolyte abnormality


Hx of Lewy Body Dementai


Nursing home resident. 


Subjective Update: 





Patient slept well through the night.  He has not had any fevers.  He is no

nverbal unable to review his review of systems.  His wife has been with him most

of the morning.  He has been drinking fluids well.  He actually ate some peaches

as well as some other of solids.  He is doing much better eating foods today.  

Although it is very time-consuming and slow for her to do so.  She has concerns 

about him going back to the nursing home as they do not have the time and/or the

patient's according to the wife to feed him appropriately.  He is down about 40 

pounds in the last couple of months.  There has been no vomiting.  He did have a

bowel movement today.  He has had multiple voids.





- Patient Data


Vitals - Most Recent: 


                                Last Vital Signs











Temp  98 F   01/15/21 10:00


 


Pulse  61   01/15/21 10:00


 


Resp  20   01/15/21 10:00


 


BP  116/65   01/15/21 10:00


 


Pulse Ox  98   01/15/21 10:00











Weight - Most Recent: 132 lb


I&O - Last 24 hours: 


                                 Intake & Output











 01/14/21 01/15/21 01/15/21





 22:59 06:59 14:59


 


Intake Total 3433 882 360


 


Balance 3433 882 360











Lab Results - Last 24 hrs: 


                         Laboratory Results - last 24 hr











  01/15/21 01/15/21 01/15/21 Range/Units





  06:25 06:25 06:25 


 


WBC  6.4    (4.0-10.0)  x10^3/uL


 


RBC  3.94 L    (4.5-6.0)  x10^6/uL


 


Hgb  12.4 L    (14.0-18.0)  g/dL


 


Hct  37.1 L    (40.0-52.0)  %


 


MCV  94.2 H    (78.0-93.0)  fL


 


MCH  31.5    (26.0-32.0)  pg


 


MCHC  33.4    (32.0-36.0)  g/dL


 


RDW Coeff of Kevin  13.3    (10.0-15.0)  %


 


Plt Count  156    (130-400)  x10^3/uL


 


Neut % (Auto)  77.1    (50.0-80.0)  %


 


Lymph % (Auto)  17.4 L    (25.0-50.0)  %


 


Mono % (Auto)  4.8    (2.0-11.0)  %


 


Eos % (Auto)  0.5    (0.0-4.0)  %


 


Baso % (Auto)  0.2    (0.2-1.2)  %


 


Sodium   142   (136-145)  mmol/L


 


Potassium   3.4 L   (3.5-5.1)  mmol/L


 


Chloride   108 H   ()  mmol/L


 


Carbon Dioxide   27   (21-32)  mmol/L


 


Anion Gap   10.4   (5-15)  mmol/L


 


BUN   33 H   (7-18)  mg/dL


 


Creatinine   0.7   (0.70-1.30)  mg/dL


 


Est Cr Clr Drug Dosing   TNP   


 


Estimated GFR (MDRD)   > 60   


 


Glucose   99   ()  mg/dL


 


Lactic Acid    0.9  (0.4-2.0)  mmol/L


 


Calcium   8.3 L   (8.5-10.1)  mg/dL


 


Magnesium   2.0   (1.8-2.4)  mg/dL


 


C-Reactive Protein   0.7   (<=0.9)  mg/dL











ALEXEY Results - Last 24 hrs: 


                                  Microbiology











 01/14/21 05:00 MRSA Surveillance Culture - Final





 Nares, Unspecified    NO MRSA ISOLATED


 


 01/13/21 16:44 Aerobic Blood Culture - Preliminary





 Blood - Venous - Lab Draw    NO GROWTH AFTER 1 DAY





 Anaerobic Blood Culture - Preliminary





    NO GROWTH AFTER 1 DAY


 


 01/13/21 16:40 Aerobic Blood Culture - Preliminary





 Blood - Venous    NO GROWTH AFTER 1 DAY





 Anaerobic Blood Culture - Preliminary





    NO GROWTH AFTER 1 DAY











Med Orders - Current: 


                               Current Medications





Acetaminophen (Tylenol)  650 mg RECTAL Q4H PRN


   PRN Reason: Mild pain/fever


Acetaminophen (Tylenol)  650 mg PO Q4H PRN


   PRN Reason: Pain/Fever


Acetaminophen (Tylenol)  650 mg RECTAL Q4H PRN


   PRN Reason: Pain/Fever


Allopurinol (Zyloprim)  300 mg PO DAILY [facility]


   Last Admin: 01/15/21 08:53 Dose:  300 mg


   Documented by: 


Aspirin (Ecotrin)  325 mg PO DAILY PRN


   PRN Reason: Chest Pain


Bisacodyl (Dulcolax)  10 mg RECTAL DAILY PRN


   PRN Reason: Constipation


Bisacodyl (Dulcolax)  5 mg PO DAILY PRN


   PRN Reason: Constipation


Carbidopa/Levodopa (Sinemet  Mg)  1.5 tab PO TID [facility]


   Last Admin: 01/15/21 12:53 Dose:  1.5 tab


   Documented by: 


Diclofenac Sodium (Voltaren 1% Gel)  0 gm TOP Q6H PRN


   PRN Reason: Pain


Donepezil HCl (Aricept)  10 mg PO DAILY [facility]


   Last Admin: 01/15/21 08:54 Dose:  10 mg


   Documented by: 


Enoxaparin Sodium (Lovenox)  40 mg SUBCUT Q24H [facility]


   Last Admin: 01/15/21 12:54 Dose:  40 mg


   Documented by: 


Finasteride (Proscar)  5 mg PO DAILY [facility]


   Last Admin: 01/15/21 08:54 Dose:  5 mg


   Documented by: 


Hydrocortisone (Hydrocortisone 1% Crm)  0 gm TOP BID [facility]


   Last Admin: 01/15/21 08:54 Dose:  1 applic


   Documented by: 


Ibuprofen (Motrin)  400 mg PO Q6H PRN


   PRN Reason: Pain


Melatonin (Melatonin)  3 mg PO BEDTIME [facility]


   Last Admin: 01/14/21 21:14 Dose:  3 mg


   Documented by: 


Eucalyptus Oil/Menthol/Camphor [ Vicks Vaporub Ointment]  1 applic TOP BID [facility]


   Last Admin: 01/15/21 08:55 Dose:  Not Given


   Documented by: 


Ketoconazole [ (Nizoral 2% Crm])  1 applic TOP BID PRN


   PRN Reason: Other


Ketoconazole [ (Nizoral 2% Crm])  1 applic TOP BID [facility]


   Last Admin: 01/15/21 08:55 Dose:  Not Given


   Documented by: 


Senna/Docusate Sodium (Senna Plus)  1 tab PO DAILY [facility]


   Last Admin: 01/15/21 08:52 Dose:  1 tab


   Documented by: 


Sertraline HCl (Zoloft)  50 mg PO DAILY [facility]


   Last Admin: 01/15/21 08:54 Dose:  50 mg


   Documented by: 


Sodium Chloride (Saline Flush)  10 ml FLUSH ASDIRECTED PRN


   PRN Reason: Keep Vein Open





Discontinued Medications





Sodium Chloride (Normal Saline)  1,000 mls @ 250 mls/hr IV ONETIME ONE


   Stop: 01/13/21 20:29


   Last Admin: 01/13/21 16:53 Dose:  250 mls/hr


   Documented by: 


Sodium Chloride (Normal Saline)  1,000 mls @ 999 mls/hr IV ONETIME ONE


   Stop: 01/13/21 18:12


   Last Admin: 01/13/21 18:00 Dose:  999 mls/hr


   Documented by: 


Sodium Chloride (Normal Saline)  1,000 mls @ 100 mls/hr IV ASDIRECTED [facility]


   Last Admin: 01/13/21 19:58 Dose:  100 mls/hr


   Documented by: 


Lactated Ringer's (Ringers, Lactated)  1,000 mls @ 75 mls/hr IV ASDIRECTED [facility]


   Last Admin: 01/15/21 01:25 Dose:  75 mls/hr


   Documented by: 








Comments:: 





Laboratory evaluation today shows an improved sodium down to 142, potassium 3.4,

creatinine 0.7 with a BUN of 33, lactic acid 0.9.  Magnesium 2.0. 


 C-reactive protein 0.7.  


T-max in last 24 hours is 98.4. 





 Blood pressure is improved as well as he was somewhat hypotensive on the 14th 

with blood pressure systolically in the 90s.  Last blood pressure 116/65 with a 

heart rate of 61 respiratory rate of 20 pulse oximetry 98.





- Exam


General: Reports: Alert (Alert unchanged from previous.  He does attempt to 

answer some questions but in discernible words.)


HEENT: Reports: Pupils Equal


Neck: Reports: Supple


Lungs: Reports: Clear to Auscultation, Normal Respiratory Effort


Cardiovascular: Reports: Regular Rate, Regular Rhythm


GI/Abdominal Exam: Normal Bowel Sounds, Soft, Non-Tender


 (Male) Exam: Deferred


Rectal (Males) Exam: Deferred


Back Exam: Reports: Normal Inspection, Full Range of Motion


Extremities: Normal Inspection, Normal Range of Motion, Non-Tender, No Pedal 

Edema, Normal Capillary Refill


Skin: Reports: Warm, Dry, Intact


Neurological: Reports: No New Focal Deficit


Psy/Mental Status: Reports: Alert

## 2021-01-15 NOTE — CR
______________________________________________________________________________   

  

[id number] RAD/RAD Chest PA or AP 1V  

EXAM:  

   

 SINGLE VIEW CHEST.  

   

 INDICATION:  

   

 FOLLOW-UP ATELECTASIS  

   

 COMPARISON:  

   

 CORRELATION IS MADE WITH JANUARY 13, 2021  

   

 FINDINGS:  

   

 The lungs currently are clear  

   

 The cardiomediastinal contour is stable  

   

 IMPRESSION:  

   

 NO PNEUMONIA OR EDEMA  

   

 NO APPRECIABLE ATELECTASIS  

   

 Electronically signed by Jose Manuel Chavez MD on 1/15/2021 9:47 AM  

   

  

Jose Manuel Chavez MD                 

 01/15/21 0949    

  

Thank you for allowing us to participate in the care of your patient.